# Patient Record
Sex: FEMALE | Race: WHITE | Employment: OTHER | URBAN - METROPOLITAN AREA
[De-identification: names, ages, dates, MRNs, and addresses within clinical notes are randomized per-mention and may not be internally consistent; named-entity substitution may affect disease eponyms.]

---

## 2019-12-29 ENCOUNTER — APPOINTMENT (OUTPATIENT)
Dept: GENERAL RADIOLOGY | Age: 74
End: 2019-12-29
Payer: MEDICARE

## 2019-12-29 ENCOUNTER — HOSPITAL ENCOUNTER (OUTPATIENT)
Age: 74
Setting detail: OBSERVATION
Discharge: HOME | End: 2019-12-30
Attending: EMERGENCY MEDICINE | Admitting: INTERNAL MEDICINE
Payer: MEDICARE

## 2019-12-29 ENCOUNTER — APPOINTMENT (OUTPATIENT)
Dept: CT IMAGING | Age: 74
End: 2019-12-29
Payer: MEDICARE

## 2019-12-29 PROBLEM — G93.40 ACUTE ENCEPHALOPATHY: Status: ACTIVE | Noted: 2019-12-29

## 2019-12-29 PROBLEM — G30.9 DEMENTIA DUE TO ALZHEIMER'S DISEASE (HCC): Status: ACTIVE | Noted: 2019-12-29

## 2019-12-29 PROBLEM — F02.80 DEMENTIA DUE TO ALZHEIMER'S DISEASE (HCC): Status: ACTIVE | Noted: 2019-12-29

## 2019-12-29 LAB
A/G RATIO: 0.9 (ref 1.1–2.2)
ALBUMIN SERPL-MCNC: 3.5 G/DL (ref 3.4–5)
ALP BLD-CCNC: 100 U/L (ref 40–129)
ALT SERPL-CCNC: 10 U/L (ref 10–40)
ANION GAP SERPL CALCULATED.3IONS-SCNC: 14 MMOL/L (ref 3–16)
APTT: 28 SEC (ref 24.2–36.2)
AST SERPL-CCNC: 16 U/L (ref 15–37)
BACTERIA: ABNORMAL /HPF
BASOPHILS ABSOLUTE: 0.1 K/UL (ref 0–0.2)
BASOPHILS RELATIVE PERCENT: 0.8 %
BILIRUB SERPL-MCNC: 0.6 MG/DL (ref 0–1)
BILIRUBIN URINE: ABNORMAL
BLOOD, URINE: ABNORMAL
BUN BLDV-MCNC: 15 MG/DL (ref 7–20)
CALCIUM SERPL-MCNC: 9.7 MG/DL (ref 8.3–10.6)
CHLORIDE BLD-SCNC: 99 MMOL/L (ref 99–110)
CLARITY: ABNORMAL
CO2: 28 MMOL/L (ref 21–32)
COLOR: YELLOW
CREAT SERPL-MCNC: 0.7 MG/DL (ref 0.6–1.2)
EOSINOPHILS ABSOLUTE: 0.1 K/UL (ref 0–0.6)
EOSINOPHILS RELATIVE PERCENT: 1 %
EPITHELIAL CELLS, UA: ABNORMAL /HPF
GFR AFRICAN AMERICAN: >60
GFR NON-AFRICAN AMERICAN: >60
GLOBULIN: 4 G/DL
GLUCOSE BLD-MCNC: 133 MG/DL (ref 70–99)
GLUCOSE URINE: NEGATIVE MG/DL
HCT VFR BLD CALC: 42.7 % (ref 36–48)
HEMOGLOBIN: 14.5 G/DL (ref 12–16)
INR BLD: 1.26 (ref 0.86–1.14)
KETONES, URINE: ABNORMAL MG/DL
LEUKOCYTE ESTERASE, URINE: NEGATIVE
LYMPHOCYTES ABSOLUTE: 1.3 K/UL (ref 1–5.1)
LYMPHOCYTES RELATIVE PERCENT: 14.4 %
MCH RBC QN AUTO: 31.7 PG (ref 26–34)
MCHC RBC AUTO-ENTMCNC: 34 G/DL (ref 31–36)
MCV RBC AUTO: 93.1 FL (ref 80–100)
MICROSCOPIC EXAMINATION: YES
MONOCYTES ABSOLUTE: 0.8 K/UL (ref 0–1.3)
MONOCYTES RELATIVE PERCENT: 9.2 %
MUCUS: ABNORMAL /LPF
NEUTROPHILS ABSOLUTE: 6.6 K/UL (ref 1.7–7.7)
NEUTROPHILS RELATIVE PERCENT: 74.6 %
NITRITE, URINE: NEGATIVE
PDW BLD-RTO: 13.2 % (ref 12.4–15.4)
PH UA: 6 (ref 5–8)
PLATELET # BLD: 270 K/UL (ref 135–450)
PMV BLD AUTO: 7.3 FL (ref 5–10.5)
POTASSIUM REFLEX MAGNESIUM: 3.7 MMOL/L (ref 3.5–5.1)
PROTEIN UA: ABNORMAL MG/DL
PROTHROMBIN TIME: 14.7 SEC (ref 10–13.2)
RBC # BLD: 4.58 M/UL (ref 4–5.2)
RBC UA: ABNORMAL /HPF (ref 0–2)
SODIUM BLD-SCNC: 141 MMOL/L (ref 136–145)
SPECIFIC GRAVITY UA: >=1.03 (ref 1–1.03)
TOTAL PROTEIN: 7.5 G/DL (ref 6.4–8.2)
TROPONIN: <0.01 NG/ML
URINE REFLEX TO CULTURE: ABNORMAL
URINE TYPE: ABNORMAL
UROBILINOGEN, URINE: 0.2 E.U./DL
WBC # BLD: 8.9 K/UL (ref 4–11)
WBC UA: ABNORMAL /HPF (ref 0–5)

## 2019-12-29 PROCEDURE — 99285 EMERGENCY DEPT VISIT HI MDM: CPT

## 2019-12-29 PROCEDURE — 2580000003 HC RX 258: Performed by: EMERGENCY MEDICINE

## 2019-12-29 PROCEDURE — G0378 HOSPITAL OBSERVATION PER HR: HCPCS

## 2019-12-29 PROCEDURE — 96361 HYDRATE IV INFUSION ADD-ON: CPT

## 2019-12-29 PROCEDURE — 84484 ASSAY OF TROPONIN QUANT: CPT

## 2019-12-29 PROCEDURE — 85025 COMPLETE CBC W/AUTO DIFF WBC: CPT

## 2019-12-29 PROCEDURE — 81001 URINALYSIS AUTO W/SCOPE: CPT

## 2019-12-29 PROCEDURE — 85610 PROTHROMBIN TIME: CPT

## 2019-12-29 PROCEDURE — 96360 HYDRATION IV INFUSION INIT: CPT

## 2019-12-29 PROCEDURE — 51701 INSERT BLADDER CATHETER: CPT

## 2019-12-29 PROCEDURE — 85730 THROMBOPLASTIN TIME PARTIAL: CPT

## 2019-12-29 PROCEDURE — 80053 COMPREHEN METABOLIC PANEL: CPT

## 2019-12-29 PROCEDURE — 71045 X-RAY EXAM CHEST 1 VIEW: CPT

## 2019-12-29 PROCEDURE — 93005 ELECTROCARDIOGRAM TRACING: CPT | Performed by: EMERGENCY MEDICINE

## 2019-12-29 PROCEDURE — 70450 CT HEAD/BRAIN W/O DYE: CPT

## 2019-12-29 RX ORDER — 0.9 % SODIUM CHLORIDE 0.9 %
1000 INTRAVENOUS SOLUTION INTRAVENOUS ONCE
Status: COMPLETED | OUTPATIENT
Start: 2019-12-29 | End: 2019-12-29

## 2019-12-29 RX ORDER — LANOLIN ALCOHOL/MO/W.PET/CERES
5 CREAM (GRAM) TOPICAL NIGHTLY PRN
COMMUNITY

## 2019-12-29 RX ORDER — SODIUM CHLORIDE 0.9 % (FLUSH) 0.9 %
10 SYRINGE (ML) INJECTION PRN
Status: DISCONTINUED | OUTPATIENT
Start: 2019-12-29 | End: 2019-12-30 | Stop reason: HOSPADM

## 2019-12-29 RX ORDER — CHOLECALCIFEROL (VITAMIN D3) 1250 MCG
CAPSULE ORAL DAILY
COMMUNITY

## 2019-12-29 RX ORDER — ACETAMINOPHEN 325 MG/1
650 TABLET ORAL EVERY 4 HOURS PRN
Status: DISCONTINUED | OUTPATIENT
Start: 2019-12-29 | End: 2019-12-30 | Stop reason: HOSPADM

## 2019-12-29 RX ORDER — CHOLECALCIFEROL (VITAMIN D3) 125 MCG
5 CAPSULE ORAL NIGHTLY PRN
Status: DISCONTINUED | OUTPATIENT
Start: 2019-12-29 | End: 2019-12-30 | Stop reason: HOSPADM

## 2019-12-29 RX ORDER — DOCUSATE SODIUM 100 MG/1
100 CAPSULE, LIQUID FILLED ORAL 2 TIMES DAILY
COMMUNITY

## 2019-12-29 RX ORDER — DONEPEZIL HYDROCHLORIDE 5 MG/1
10 TABLET, FILM COATED ORAL NIGHTLY
Status: DISCONTINUED | OUTPATIENT
Start: 2019-12-29 | End: 2019-12-30 | Stop reason: HOSPADM

## 2019-12-29 RX ORDER — DOCUSATE SODIUM 100 MG/1
100 CAPSULE, LIQUID FILLED ORAL 2 TIMES DAILY
Status: DISCONTINUED | OUTPATIENT
Start: 2019-12-29 | End: 2019-12-30 | Stop reason: HOSPADM

## 2019-12-29 RX ORDER — ONDANSETRON 2 MG/ML
4 INJECTION INTRAMUSCULAR; INTRAVENOUS EVERY 6 HOURS PRN
Status: DISCONTINUED | OUTPATIENT
Start: 2019-12-29 | End: 2019-12-30 | Stop reason: HOSPADM

## 2019-12-29 RX ORDER — SODIUM CHLORIDE 0.9 % (FLUSH) 0.9 %
10 SYRINGE (ML) INJECTION EVERY 12 HOURS SCHEDULED
Status: DISCONTINUED | OUTPATIENT
Start: 2019-12-29 | End: 2019-12-30 | Stop reason: HOSPADM

## 2019-12-29 RX ORDER — MEMANTINE HYDROCHLORIDE 5 MG/1
5 TABLET ORAL DAILY
Status: DISCONTINUED | OUTPATIENT
Start: 2019-12-30 | End: 2019-12-30 | Stop reason: HOSPADM

## 2019-12-29 RX ADMIN — SODIUM CHLORIDE 1000 ML: 9 INJECTION, SOLUTION INTRAVENOUS at 20:10

## 2019-12-30 VITALS
TEMPERATURE: 96.9 F | HEIGHT: 65 IN | OXYGEN SATURATION: 92 % | HEART RATE: 76 BPM | RESPIRATION RATE: 14 BRPM | BODY MASS INDEX: 25.34 KG/M2 | WEIGHT: 152.12 LBS | SYSTOLIC BLOOD PRESSURE: 127 MMHG | DIASTOLIC BLOOD PRESSURE: 60 MMHG

## 2019-12-30 LAB
ANION GAP SERPL CALCULATED.3IONS-SCNC: 14 MMOL/L (ref 3–16)
BASOPHILS ABSOLUTE: 0.1 K/UL (ref 0–0.2)
BASOPHILS RELATIVE PERCENT: 0.8 %
BUN BLDV-MCNC: 14 MG/DL (ref 7–20)
CALCIUM SERPL-MCNC: 8.9 MG/DL (ref 8.3–10.6)
CHLORIDE BLD-SCNC: 101 MMOL/L (ref 99–110)
CO2: 23 MMOL/L (ref 21–32)
CREAT SERPL-MCNC: 0.7 MG/DL (ref 0.6–1.2)
EOSINOPHILS ABSOLUTE: 0.1 K/UL (ref 0–0.6)
EOSINOPHILS RELATIVE PERCENT: 1.5 %
GFR AFRICAN AMERICAN: >60
GFR NON-AFRICAN AMERICAN: >60
GLUCOSE BLD-MCNC: 141 MG/DL (ref 70–99)
HCT VFR BLD CALC: 40.1 % (ref 36–48)
HEMOGLOBIN: 13.6 G/DL (ref 12–16)
LYMPHOCYTES ABSOLUTE: 1.2 K/UL (ref 1–5.1)
LYMPHOCYTES RELATIVE PERCENT: 13.6 %
MAGNESIUM: 1.7 MG/DL (ref 1.8–2.4)
MCH RBC QN AUTO: 31.6 PG (ref 26–34)
MCHC RBC AUTO-ENTMCNC: 33.9 G/DL (ref 31–36)
MCV RBC AUTO: 93.2 FL (ref 80–100)
MONOCYTES ABSOLUTE: 0.9 K/UL (ref 0–1.3)
MONOCYTES RELATIVE PERCENT: 9.7 %
NEUTROPHILS ABSOLUTE: 6.5 K/UL (ref 1.7–7.7)
NEUTROPHILS RELATIVE PERCENT: 74.4 %
PDW BLD-RTO: 13.2 % (ref 12.4–15.4)
PLATELET # BLD: 246 K/UL (ref 135–450)
PMV BLD AUTO: 7.5 FL (ref 5–10.5)
POTASSIUM REFLEX MAGNESIUM: 3.1 MMOL/L (ref 3.5–5.1)
RBC # BLD: 4.3 M/UL (ref 4–5.2)
SODIUM BLD-SCNC: 138 MMOL/L (ref 136–145)
WBC # BLD: 8.8 K/UL (ref 4–11)

## 2019-12-30 PROCEDURE — 2580000003 HC RX 258: Performed by: NURSE PRACTITIONER

## 2019-12-30 PROCEDURE — 97530 THERAPEUTIC ACTIVITIES: CPT

## 2019-12-30 PROCEDURE — 6370000000 HC RX 637 (ALT 250 FOR IP)

## 2019-12-30 PROCEDURE — G0378 HOSPITAL OBSERVATION PER HR: HCPCS

## 2019-12-30 PROCEDURE — 97535 SELF CARE MNGMENT TRAINING: CPT

## 2019-12-30 PROCEDURE — 6370000000 HC RX 637 (ALT 250 FOR IP): Performed by: NURSE PRACTITIONER

## 2019-12-30 PROCEDURE — 96372 THER/PROPH/DIAG INJ SC/IM: CPT

## 2019-12-30 PROCEDURE — 97167 OT EVAL HIGH COMPLEX 60 MIN: CPT

## 2019-12-30 PROCEDURE — 80048 BASIC METABOLIC PNL TOTAL CA: CPT

## 2019-12-30 PROCEDURE — 6360000002 HC RX W HCPCS: Performed by: NURSE PRACTITIONER

## 2019-12-30 PROCEDURE — 97162 PT EVAL MOD COMPLEX 30 MIN: CPT

## 2019-12-30 PROCEDURE — 83735 ASSAY OF MAGNESIUM: CPT

## 2019-12-30 PROCEDURE — 85025 COMPLETE CBC W/AUTO DIFF WBC: CPT

## 2019-12-30 RX ORDER — MINERAL OIL 100 G/100G
1 OIL RECTAL ONCE
Status: COMPLETED | OUTPATIENT
Start: 2019-12-30 | End: 2019-12-30

## 2019-12-30 RX ORDER — CASTOR OIL AND BALSAM, PERU 788; 87 MG/G; MG/G
OINTMENT TOPICAL 2 TIMES DAILY
Status: DISCONTINUED | OUTPATIENT
Start: 2019-12-30 | End: 2019-12-30 | Stop reason: HOSPADM

## 2019-12-30 RX ADMIN — DOCUSATE SODIUM 100 MG: 100 CAPSULE, LIQUID FILLED ORAL at 01:47

## 2019-12-30 RX ADMIN — MEMANTINE 5 MG: 5 TABLET ORAL at 09:26

## 2019-12-30 RX ADMIN — MINERAL OIL 1 ENEMA: 100 ENEMA RECTAL at 01:42

## 2019-12-30 RX ADMIN — Medication: at 12:23

## 2019-12-30 RX ADMIN — Medication 10 ML: at 02:05

## 2019-12-30 RX ADMIN — Medication 10 ML: at 09:28

## 2019-12-30 RX ADMIN — DOCUSATE SODIUM 100 MG: 100 CAPSULE, LIQUID FILLED ORAL at 09:26

## 2019-12-30 RX ADMIN — DONEPEZIL HYDROCHLORIDE 10 MG: 5 TABLET, FILM COATED ORAL at 01:45

## 2019-12-30 RX ADMIN — ENOXAPARIN SODIUM 40 MG: 40 INJECTION SUBCUTANEOUS at 09:27

## 2019-12-30 NOTE — ED PROVIDER NOTES
[facility]  ED  EMERGENCY DEPARTMENT ENCOUNTER      Pt Name: Deb Koehler  MRN: [id number]  Doriegfziggy 1945  Date of evaluation: 12/29/2019  Provider: Salvador Nguyễn MD    CHIEF COMPLAINT       Chief Complaint   Patient presents with    Fatigue     dementia pt, lives in [facility], stopped eating a week ago, no bm for 10 days, too weak to walk for 4 days         HISTORY OF PRESENT ILLNESS   (Location/Symptom, Timing/Onset, Context/Setting, Quality, Duration, Modifying Factors, Severity)  Note limiting factors. Deb Koehler is a 76 y.o. female with past medical history of profound dementia here today for worsening mental status. Patient is here with her  and visiting from out of town. He notes that over the course the last 5 to 7 days she has had worsening mental status. He states she is much less responsive than usual.  She is not eating or drinking. He states she sleeps all day. This is very atypical for her. While she typically does have some problems with confusion and does not speak much at baseline due to her dementia, she never sleeps this much and is always much more interactive and less agitated. This is happened in the past when the patient had a urinary tract infection and he is concerned for the same. There is been no vomiting and no fevers. Patient provides no insight secondary to underlying dementia    HPI    Nursing Notes were reviewed. REVIEW OF SYSTEMS    (2-9 systems for level 4, 10 or more for level 5)     Review of Systems    Please see HPI for pertinent positive and negative review of system findings. A full 10 system ROS was performed and otherwise negative. PAST MEDICAL HISTORY     Past Medical History:   Diagnosis Date    Dementia [facility])          SURGICAL HISTORY     History reviewed. No pertinent surgical history.       CURRENT MEDICATIONS       Previous Medications    CHOLECALCIFEROL (VITAMIN D3) 1.25 MG (11180 UT) CAPS    Take by mouth DOCUSATE SODIUM (COLACE) 100 MG CAPSULE    Take 100 mg by mouth 2 times daily    DONEPEZIL HCL (ARICEPT PO)    Take by mouth    MEMANTINE HCL (NAMENDA PO)    Take by mouth       ALLERGIES     Sulfa antibiotics    FAMILY HISTORY     History reviewed. No pertinent family history. SOCIAL HISTORY       Social History     Socioeconomic History    Marital status:      Spouse name: None    Number of children: None    Years of education: None    Highest education level: None   Occupational History    None   Social Needs    Financial resource strain: None    Food insecurity:     Worry: None     Inability: None    Transportation needs:     Medical: None     Non-medical: None   Tobacco Use    Smoking status: Never Smoker    Smokeless tobacco: Never Used   Substance and Sexual Activity    Alcohol use: None    Drug use: None    Sexual activity: None   Lifestyle    Physical activity:     Days per week: None     Minutes per session: None    Stress: None   Relationships    Social connections:     Talks on phone: None     Gets together: None     Attends [employer] service: None     Active member of club or organization: None     Attends meetings of clubs or organizations: None     Relationship status: None    Intimate partner violence:     Fear of current or ex partner: None     Emotionally abused: None     Physically abused: None     Forced sexual activity: None   Other Topics Concern    None   Social History Narrative    None       SCREENINGS    [place] Coma Scale  Eye Opening: Spontaneous  Best Verbal Response: Oriented  Best Motor Response: Obeys commands  [place] Coma Scale Score: 15          PHYSICAL EXAM    (up to 7 for level 4, 8 or more for level 5)     ED Triage Vitals [12/29/19 1743]   BP Temp Temp Source Pulse Resp SpO2 Height Weight   139/76 98.4 °F (36.9 °C) Oral 92 16 96 % -- 154 lb (69.9 kg)       Physical Exam    General appearance:  Cooperative. No acute distress. Skin:  Warm. Dry. Eye:  Extraocular movements intact. Pupils are equally round and reactive to light and accommodation. Extraocular motions are intact. CN II-XII intact. Ears, nose, mouth and throat:  Oral mucosa dry  Neck:  Trachea midline. Heart:  Regular rate and rhythm  Perfusion:  intact  Respiratory:  Lungs clear to auscultation bilaterally. Respirations nonlabored. Abdominal:   Non distended. Nontender  Neurological: Lying in bed with her eyes closed. Does not follow commands but will awaken to stimuli. Oriented to person only. .  Moves all extremities spontaneously. Intact sensation throughout. Intact finger-to-nose bilaterally.  2+ bilateral patellar reflexes  Musculoskeletal:   Normal ROM, no deformities          Psychiatric:  Normal mood      DIAGNOSTIC RESULTS       Labs Reviewed   COMPREHENSIVE METABOLIC PANEL W/ REFLEX TO MG FOR LOW K - Abnormal; Notable for the following components:       Result Value    Glucose 133 (*)     Albumin/Globulin Ratio 0.9 (*)     All other components within normal limits    Narrative:     Performed at:  [address],  [place], [address] [employer]   Phone [phone]   PROTIME-INR - Abnormal; Notable for the following components:    Protime 14.7 (*)     INR 1.26 (*)     All other components within normal limits    Narrative:     Performed at:  [address],  [place], [address] [employer]   Phone [phone]   URINE RT REFLEX TO CULTURE - Abnormal; Notable for the following components:    Clarity, UA SL CLOUDY (*)     Bilirubin Urine SMALL (*)     Ketones, Urine TRACE (*)     Blood, Urine SMALL (*)     Protein, UA TRACE (*)     All other components within normal limits    Narrative:     Performed at:  [address],  [place], [address] [employer]   Phone [phone]   MICROSCOPIC URINALYSIS - Abnormal; Notable for the following components:    Mucus, UA 3+ (*)     RBC, UA 3-5 (*)     Bacteria, UA 1+ (*)     All other components within normal limits    Narrative:     Performed at:  [address], [facility] [employer]   Phone [phone]   CBC WITH AUTO DIFFERENTIAL    Narrative:     Performed at:  [place] [address] [employer]   Phone [phone]   TROPONIN    Narrative:     Performed at:  [address], [facility] [employer]   Phone [phone]   APTT    Narrative:     Performed at:  [address], [facility] [employer]   Phone [phone]       Interpretation per the Radiologist below, if obtained/available at the time of this note:    CT Head WO Contrast   Final Result   No acute intracranial abnormality. Mild generalized cerebral atrophy and mild chronic small vessel white matter   ischemic changes. XR CHEST PORTABLE   Final Result   Bibasilar atelectasis and/or scarring. All other labs/imaging were within normal range or not returned as of this dictation. EMERGENCY DEPARTMENT COURSE and DIFFERENTIAL DIAGNOSIS/MDM:   Vitals:    Vitals:    12/29/19 1743 12/29/19 1941   BP: 139/76 130/69   Pulse: 92 85   Resp: 16 20   Temp: 98.4 °F (36.9 °C)    TempSrc: Oral    SpO2: 96% 98%   Weight: 154 lb (69.9 kg)        Sinus rhythm with premature supraventricular complexes rate of 82 bpm.  Incomplete right bundle branch block. Anterior lateral Q waves. No ST elevation. No prior    Patient presents emergency department today with much worsening dementia type symptoms. Vital signs are stable. Appears clinically dehydrated. Certainly concerning for possible urinary tract infection laboratory studies were performed but otherwise normal.  Head CT negative. Chest x-ray unremarkable. Urinalysis shows no signs of infection.   Still, the patient

## 2019-12-30 NOTE — PROGRESS NOTES
4 Eyes Skin Assessment      The patient is being assess for   Admission     I agree that 2 RN's have performed a thorough Head to Toe Skin Assessment on the patient. ALL assessment sites listed below have been assessed. Areas assessed by both nurses:   [x]   Head, Face, and Ears   [x]   Shoulders, Back, and Chest, Abdomen  [x]   Arms, Elbows, and Hands   [x]   Coccyx, Sacrum, and Ischium  [x]   Legs, Feet, and Heels                          **SHARE this note so that the co-signing nurse is able to place an eSignature**     Co-signer eSignature: {Esignature:[id number]}     Does the Patient have Skin Breakdown?   Yes LDA WOUND CARE was Initiated documentation include the Brooklynn-wound, Wound Assessment, Measurements, Dressing Treatment, Drainage, and Color\",          Jasbir Prevention initiated:  Yes   Wound Care Orders initiated:  Yes      [address]  nurse consulted for Pressure Injury (Stage 3,4, Unstageable, DTI, NWPT, Complex wounds)and New or Established Ostomies:  Yes       Primary Nurse eSignature: Electronically signed by Bindu Marina RN on 12/30/19 at 12:52 AM

## 2019-12-30 NOTE — PLAN OF CARE
Problem: Falls - Risk of:  Goal: Will remain free from falls  Description  Will remain free from falls  Note:   Bed in low position, wheels locked. Side rails up x2. Call light in reach and bed alarm remains activated.  at bedside, instructed to call for assistance as needed, he verbalizes understanding.

## 2019-12-30 NOTE — PROGRESS NOTES
Occupational Therapy   Occupational Therapy Initial Assessment and Treatment Note   Date: 2019   Patient Name: Charles Coy  MRN: [id number]     : 1945    Date of Service: 2019    Discharge Recommendations:  Subacute/Skilled Nursing Facility     Assessment   Performance deficits / Impairments: Decreased functional mobility ; Decreased ADL status; Decreased balance;Decreased fine motor control;Decreased coordination;Decreased endurance;Decreased ROM; Decreased cognition  Assessment: OT eval completed. Pt admitted for dementia due to Alzheimer's. Prior to onset, spouse states that pt was able to ambulate independently and feed herself. She is minimally verbal at baseline due to dementia. Assist provided with bathing/dressing x6 months. Spouse reports a rapid decline in function recently in terms of level of alertness, mobility and balance. Pt would benefit from skilled OT services for ADLs, mobility, UE function. Cont OT. Prognosis: Fair  Decision Making: High Complexity  OT Education: OT Role;Plan of Care;Family Education  REQUIRES OT FOLLOW UP: Yes  Activity Tolerance  Activity Tolerance: Patient limited by fatigue;Treatment limited secondary to decreased cognition  Safety Devices  Safety Devices in place: Yes  Type of devices: Left in bed;Bed alarm in place;Call light within reach;Gait belt;Nurse notified         Patient Diagnosis(es): The primary encounter diagnosis was Failure to thrive in adult. A diagnosis of Dehydration was also pertinent to this visit. has a past medical history of Cancer ([facility] and Dementia ([facility] [address] has no past surgical history on file.        Restrictions  Restrictions/Precautions  Restrictions/Precautions: General Precautions, Fall Risk  Position Activity Restriction  Other position/activity restrictions: High fall risk per chart, up with assist  Subjective   General  Chart Reviewed: Yes  Patient assessed for rehabilitation services?: Yes  Additional Pertinent Hx: dementia, UTI  Family / Caregiver Present: Yes(spouse)  Referring Practitioner: FLOWER Braun  Diagnosis: dementia due to Alzheimers disease  Subjective  Subjective: Pt resting soundly in bed, leaning towards L bedrail with upper body. General Comment  Comments: RN approved therapy  Patient Currently in Pain: Other (comment)(NALLELY )  Vital Signs  Patient Currently in Pain: Other (comment)(NALLELY )  Social/Functional History  Social/Functional History  Lives With: Spouse(Lives in [place]. Pt is in town, visiting daughter. Below information is based on daughter's home. )  Type of Home: House  Home Layout: Two level, Able to Live on Main level with bedroom/bathroom, Performs ADL's on one level  Home Access: Stairs to enter without rails  Entrance Stairs - Number of Steps: 3  ADL Assistance: Needs assistance(Assist from spouse for at least 6 months for bathing, dressing. Pt able to feed herself. )  Homemaking Responsibilities: No  Ambulation Assistance: Independent(no device)  Transfer Assistance: Independent  Active : No  Occupation: Retired  Type of occupation: psychologist  Leisure & Hobbies: walks 1-2 miles with spouse  Additional Comments: Speech deficits x1 year due to dementia. Johanny Valerio recently at home (possibly out of bed)     Objective   Vision: Within Functional Limits  Hearing: Within functional limits    Orientation  Overall Orientation Status: (Pt is non-verbal (hx of dementia), initially lethargic.)     Balance  Sitting Balance: Contact guard assistance  Standing Balance: Dependent/Total(mod x2 at EOB with B HHA)  Standing Balance  Activity: Stood EOB ~1 min with max x2 with B HHA. Pt unable to take steps or clear feet from floor in order to transfer or walk. RN notified of mobility status  ADL  Feeding: Dependent/Total  Grooming: Dependent/Total  UE Dressing: Dependent/Total  LE Dressing: Dependent/Total  Toileting: Dependent/Total(Incontinent of bowel. Pericare and brief changed in supine. )  Coordination  Movements Are Fluid And Coordinated: No  Coordination and Movement description: Fine motor impairments;Gross motor impairments;Decreased speed;Decreased accuracy;Tremors; Left UE;Right UE  Quality of Movement Other  Comment: Rigidity noted in BUE (L>R). Difficulty with PROM     Bed mobility  Supine to Sit: Maximum assistance;2 Person assistance  Sit to Supine: Maximum assistance;2 Person assistance  Transfers  Sit to stand: Maximum assistance;2 Person assistance  Stand to sit: Maximum assistance;2 Person assistance     Cognition  Overall Cognitive Status: Exceptions  Arousal/Alertness: Inconsistent responses to stimuli  Following Commands: Does not follow commands  Attention Span: Unable to maintain attention  Problem Solving: Decreased awareness of errors  Initiation: Requires cues for all  Sequencing: Requires cues for all  Cognition Comment: Pt became alert once seated EOB. She does not initiate activity, requires hand-over-hand assistance. LUE PROM (degrees)  LUE General PROM: Impaired L shoulder and elbow AROM. Has L hand grasp  RUE PROM (degrees)  RUE PROM: WFL  RUE AROM (degrees)  RUE General AROM: Demo's spontaneous RUE movement.  R shoulder AAROM 0-100*, R elbow [facility]       Plan   Plan  Times per week: 3-5x  AM-PAC Score      AM-[facility] Inpatient Daily Activity Raw Score: 6 (12/30/19 0936)  AM-PAC Inpatient ADL T-Scale Score : 17.07 (12/30/19 0936)  ADL Inpatient CMS 0-100% Score: 100 (12/30/19 0936)  ADL Inpatient CMS G-Code Modifier : CN (12/30/19 0011)  Goals  Short term goals  Time Frame for Short term goals: 1 week (1/7) unless noted  Short term goal 1: Perform functional transfer with mod x1  Short term goal 2: Stand x2 min with mod x1 for ADL activity  Short term goal 3: Take drink from cup with CGA  Patient Goals   Patient goals : Spouse would like pt to return to [place] for SNF placement    If pt is discharged prior to next OT session, this note will serve as the discharge summary.     Therapy Time   Individual Concurrent Group Co-treatment   Time In 0850         Time Out 0924         Minutes 34         Timed Code Treatment Minutes: 24 Minutes(10 min eval )     Santiago GARCIA/KALEE

## 2019-12-30 NOTE — H&P
Pleasant, elderly female in no apparent distress, appears stated age and cooperative. Flat affect. Generalized weakness. Confused. Some tremors noted. HEENT:  Normal cephalic, atraumatic without obvious deformity. Pupils equal, round, and reactive to light. Extra ocular muscles intact. Conjunctivae/corneas clear. Neck: Supple, with full range of motion. No jugular venous distention. Trachea midline. Respiratory:  Normal respiratory effort. Clear to auscultation, bilaterally without Rales/Wheezes/Rhonchi. Cardiovascular:  Regular rate and rhythm with normal S1/S2 without murmurs, rubs or gallops. Abdomen: Soft, non-tender, non-distended with normal bowel sounds. Musculoskeletal:  No clubbing, cyanosis or edema bilaterally. Full range of motion without deformity. Skin: Skin color, texture, turgor normal.  No rashes or lesions. Neurologic:  Neurovascularly intact without any focal sensory/motor deficits.  Cranial nerves: II-XII intact, grossly non-focal.  Psychiatric:  Alert and oriented, thought content appropriate, normal insight  Capillary Refill: Brisk,< 3 seconds   Peripheral Pulses: +2 palpable, equal bilaterally       Labs:     Recent Labs     12/29/19 1841   WBC 8.9   HGB 14.5   HCT 42.7        Recent Labs     12/29/19 1841      K 3.7   CL 99   CO2 28   BUN 15   CREATININE 0.7   CALCIUM 9.7     Recent Labs     12/29/19 1841   AST 16   ALT 10   BILITOT 0.6   ALKPHOS 100     Recent Labs     12/29/19 1841   INR 1.26*     Recent Labs     12/29/19 1841   TROPONINI <0.01       Urinalysis:      Lab Results   Component Value Date    NITRU Negative 12/29/2019    WBCUA 0-2 12/29/2019    BACTERIA 1+ 12/29/2019    RBCUA 3-5 12/29/2019    BLOODU SMALL 12/29/2019    SPECGRAV >=1.030 12/29/2019    GLUCOSEU Negative 12/29/2019       Radiology:     CXR: I have reviewed the CXR with the following interpretation: Bibasilar atelectasis and/or scarring  EKG:  I have reviewed the EKG with the following

## 2019-12-30 NOTE — ED NOTES
Bedside report given to DENISARN. Pt transported to floor in stable condition via stretcher by tech. Pt transferred with all belongings.       Giselle Clarke RN  12/29/19 7531

## 2019-12-30 NOTE — DISCHARGE SUMMARY
Hospital Medicine Discharge Summary    Patient ID: Linda Chi      Patient's PCP: No primary care provider on file. Admit Date: 12/29/2019     Discharge Date: 12/30/2019      Admitting Physician: Shirley Cabral MD     Discharge Physician: Marisol Michelle MD     Discharge Diagnoses: Active Hospital Problems    Diagnosis    Dementia due to Alzheimer's disease ([facility] [G30.9, F02.80]    Acute encephalopathy [G93.40]       The patient was seen and examined on day of discharge and this discharge summary is in conjunction with any daily progress note from day of discharge. Hospital Course:       Acute encephalopathy in setting of known history of dementia, likely 2/2 advancing dementia. - CT head negative, CXR negative and U/A negative w/out s/s of infection     - Case management consulted and appreciated. They are visiting from [place] and ultimately decided on placement there in [place]. Continue home namenda and aricept       Labs: For convenience and continuity at follow-up the following most recent labs are provided:      CBC:    Lab Results   Component Value Date    WBC 8.8 12/30/2019    HGB 13.6 12/30/2019    HCT 40.1 12/30/2019     12/30/2019       Renal:    Lab Results   Component Value Date     12/30/2019    K 3.1 12/30/2019     12/30/2019    CO2 23 12/30/2019    BUN 14 12/30/2019    CREATININE 0.7 12/30/2019    CALCIUM 8.9 12/30/2019         Significant Diagnostic Studies    Radiology:   CT Head WO Contrast   Final Result   No acute intracranial abnormality. Mild generalized cerebral atrophy and mild chronic small vessel white matter   ischemic changes. XR CHEST PORTABLE   Final Result   Bibasilar atelectasis and/or scarring.                 Consults:     IP CONSULT TO HOSPITALIST  IP CONSULT TO CASE MANAGEMENT    Disposition: Home    Condition at Discharge: Stable    Discharge Instructions/Follow-up:  W/ PCP prn    Code Status:  Full code    Activity: activity as tolerated    Diet: regular diet      Discharge Medications:     Discharge Medication List as of 12/30/2019  2:47 PM           Details   Cholecalciferol (VITAMIN D3) 1.25 MG (57807 UT) CAPS Take by mouth daily Historical Med      docusate sodium (COLACE) 100 MG capsule Take 100 mg by mouth 2 times dailyHistorical Med      Donepezil HCl (ARICEPT PO) Take by mouthHistorical Med      Memantine HCl (NAMENDA PO) Take 10 mg by mouth 2 times daily Historical Med      melatonin 3 MG TABS tablet Take 5 mg by mouth nightly as neededHistorical Med             Time Spent on discharge is more than 30 minutes in the examination, evaluation, counseling and review of medications and discharge plan.       Signed:    Dolly Vale MD   12/30/2019

## 2019-12-30 NOTE — PLAN OF CARE
Problem: Falls - Risk of:  Goal: Will remain free from falls  Description  Will remain free from falls  12/30/2019 1053 by Jf Adorno RN  Outcome: Ongoing     Problem: Risk for Impaired Skin Integrity  Goal: Tissue integrity - skin and mucous membranes  Description  Structural intactness and normal physiological function of skin and  mucous membranes.   Outcome: Ongoing     Problem: Injury - Risk of, Physical Injury:  Goal: Will remain free from falls  Description  Will remain free from falls  12/30/2019 1053 by Jf Adorno RN  Outcome: Ongoing

## 2019-12-30 NOTE — PROGRESS NOTES
[facility] has no past surgical history on file. Restrictions  Restrictions/Precautions  Restrictions/Precautions: General Precautions, Fall Risk  Position Activity Restriction  Other position/activity restrictions: High fall risk per chart, up with assist     Vision/Hearing  Vision: Within Functional Limits  Hearing: Within functional limits       Subjective  General  Chart Reviewed: Yes  Patient assessed for rehabilitation services?: Yes  Response To Previous Treatment: Not applicable  Family / Caregiver Present: Yes()  Referring Practitioner: YOVANY Capellan NP  Referral Date : 12/29/19  Diagnosis: Fatigue & weakness 2/2 advanced dementia  General Comment  Comments: PT asleep in bed upon arrival. She is minimally responsive.  agrees to OT/PT Eval at this time. Subjective  Subjective: Pt is minimally responsive and non-verbal throughout. She opens her eyes briefly while sitting EOB and standing. Pain Screening  Patient Currently in Pain: Other (comment)(NALLELY)  Vital Signs  Patient Currently in Pain: Other (comment)(NALLELY)       Orientation  Orientation  Orientation Level: Unable to assess     Social/Functional History  Social/Functional History  Lives With: Spouse(Lives in [place]. Pt is in town, visiting daughter. Below information is based on daughter's home. )  Type of Home: House  Home Layout: Two level, Able to Live on Main level with bedroom/bathroom, Performs ADL's on one level  Home Access: Stairs to enter without rails  Entrance Stairs - Number of Steps: 3  ADL Assistance: Needs assistance(Assist from spouse for at least 6 months for bathing, dressing. Pt able to feed herself. )  Homemaking Responsibilities: No  Ambulation Assistance: Independent(no device)  Transfer Assistance: Independent  Active : No  Occupation: Retired  Type of occupation: psychologist  Leisure & Hobbies: walks 1-2 miles with spouse  Additional Comments: Speech deficits x1 year due to dementia.  Aris Latif recently at home (possibly out of bed)       Objective     Observation/Palpation  Posture: Poor    AROM RLE (degrees)  RLE AROM: Exceptions  RLE General AROM: Hip and knee flexion grossly limited ~25%. Ankle WFL. AROM LLE (degrees)  LLE AROM : Exceptions  LLE General AROM: Hip and knee flexion grossly limited ~25%. Ankle WFL. Strength RLE  Strength RLE: Exception  Comment: Difficult to formally assess due to pt's lethargy. Grossly 3-/5 based on functional activity. Strength LLE  Strength LLE: Exception  Comment: Difficult to formally assess due to pt's lethargy. Grossly 3-/5 based on functional activity. Bed mobility  Rolling to Left: Maximum assistance;2 Person assistance  Rolling to Right: Maximum assistance;2 Person assistance  Supine to Sit: Maximum assistance;2 Person assistance  Sit to Supine: Maximum assistance;2 Person assistance     Transfers  Sit to Stand: 2 Person Assistance;Maximum Assistance(from EOB)  Stand to sit: 2 Person Assistance; Moderate Assistance  Ambulation  Ambulation?: No(Unsafe at this time due to pt's lethargy)     Balance  Sitting - Static: Poor(Pt sat EOB for 5 minutes with Min A x 1 for trunk control due to posterior lean )  Standing - Static: Poor(Pt requires strong HHA x 2 at CGA-Min A for lateral & posterior sway. Pt stands at EOB for 1 minute. )     Exercises  Ankle Pumps: x10 BLE, minimal AROM despite cuing     Plan   Plan  Times per week: 3-5x  Times per day: Daily  Current Treatment Recommendations: Strengthening, Functional Mobility Training, IADL Training, Home Exercise Program, Equipment Evaluation, Education, & procurement, ROM, Transfer Training, Gait Training, Safety Education & Training, Cognitive Reorientation, Balance Training, ADL/Self-care Training, Endurance Training, Stair training, Patient/Caregiver Education & Training, Positioning, Pain Management, Modalities  Safety Devices  Type of devices:  All fall risk precautions in place, Gait belt, Call light within reach, Bed alarm in place, Patient at risk for falls, Left in bed, Nurse notified    G-Code       OutComes Score                                                  AM-PAC Score  AM-PAC Inpatient Mobility Raw Score : 9 (12/30/19 1451)  AM-PAC Inpatient T-Scale Score : 30.55 (12/30/19 1451)  Mobility Inpatient CMS 0-100% Score: 81.38 (12/30/19 1451)  Mobility Inpatient CMS G-Code Modifier : CM (12/30/19 1451)          Goals  Short term goals  Time Frame for Short term goals: 1 week (1/6/19), unless otherwise stated  Short term goal 1: Pt will perform bed mobility Mod (I)  Short term goal 2: Pt will transfer sit<>stand using LRAD with Supervision  Short term goal 3: Pt will sit EOB 5 minutes with Supervision demonstrating increased activity tolerance  Short term goal 4: Pt will ambulate 48' using LRAD with SBA  Short term goal 5: By 1/2/19, pt will participate in seated LE Ther Ex for strengthening  Patient Goals   Patient goals : Pt unable to verbalize goal. Spouse would like pt to return to [place] for SNF placement.        Therapy Time   Individual Concurrent Group Co-treatment   Time In 0830         Time Out 0900         Minutes 30         Timed Code Treatment Minutes: 20 Minutes       James Aguilar PT , DPT

## 2019-12-30 NOTE — PROGRESS NOTES
HGB 14.5 13.6   HCT 42.7 40.1    246     Recent Labs     12/29/19  1841 12/30/19  0614    138   K 3.7 3.1*   CL 99 101   CO2 28 23   BUN 15 14   CREATININE 0.7 0.7   CALCIUM 9.7 8.9     Recent Labs     12/29/19  1841   AST 16   ALT 10   BILITOT 0.6   ALKPHOS 100     Recent Labs     12/29/19  1841   INR 1.26*     Recent Labs     12/29/19 1841   TROPONINI <0.01       Urinalysis:      Lab Results   Component Value Date    NITRU Negative 12/29/2019    WBCUA 0-2 12/29/2019    BACTERIA 1+ 12/29/2019    RBCUA 3-5 12/29/2019    BLOODU SMALL 12/29/2019    SPECGRAV >=1.030 12/29/2019    GLUCOSEU Negative 12/29/2019       Radiology:  CT Head WO Contrast   Final Result   No acute intracranial abnormality. Mild generalized cerebral atrophy and mild chronic small vessel white matter   ischemic changes. XR CHEST PORTABLE   Final Result   Bibasilar atelectasis and/or scarring. Assessment/Plan:    Active Hospital Problems    Diagnosis    Dementia due to Alzheimer's disease ([facility] [G30.9, F02.80]    Acute encephalopathy [G93.40]     Acute encephalopathy in setting of known history of dementia, likely 2/2 advancing dementia.  would like to talk about placement for the patient.   - CT head negative, CXR negative and U/A negative w/out s/s of infection    - Case management consulted.  wants to talk about placement. They are visiting from [place], but would like to look into placement here in [facility] versus in [address] [place] [address] home namenda and aricept       DVT Prophylaxis: LMWH  Diet: DIET GENERAL;  Code Status: Full Code      PT/OT Eval Status: Seen w/ recs for SNF     Dispo - Placed in OBS.  Discharge pending placement decision    Marion Silva MD

## 2019-12-31 LAB
EKG ATRIAL RATE: 82 BPM
EKG DIAGNOSIS: NORMAL
EKG P AXIS: 63 DEGREES
EKG P-R INTERVAL: 158 MS
EKG Q-T INTERVAL: 390 MS
EKG QRS DURATION: 96 MS
EKG QTC CALCULATION (BAZETT): 455 MS
EKG R AXIS: 215 DEGREES
EKG T AXIS: 24 DEGREES
EKG VENTRICULAR RATE: 82 BPM

## 2019-12-31 PROCEDURE — 93010 ELECTROCARDIOGRAM REPORT: CPT | Performed by: INTERNAL MEDICINE
